# Patient Record
Sex: FEMALE | Race: BLACK OR AFRICAN AMERICAN | NOT HISPANIC OR LATINO | URBAN - METROPOLITAN AREA
[De-identification: names, ages, dates, MRNs, and addresses within clinical notes are randomized per-mention and may not be internally consistent; named-entity substitution may affect disease eponyms.]

---

## 2021-02-15 ENCOUNTER — EMERGENCY (EMERGENCY)
Facility: HOSPITAL | Age: 81
LOS: 0 days | Discharge: HOME | End: 2021-02-16
Attending: EMERGENCY MEDICINE | Admitting: EMERGENCY MEDICINE
Payer: COMMERCIAL

## 2021-02-15 VITALS
WEIGHT: 240.08 LBS | TEMPERATURE: 99 F | SYSTOLIC BLOOD PRESSURE: 178 MMHG | RESPIRATION RATE: 16 BRPM | OXYGEN SATURATION: 96 % | HEIGHT: 64 IN | HEART RATE: 68 BPM | DIASTOLIC BLOOD PRESSURE: 90 MMHG

## 2021-02-15 DIAGNOSIS — R07.9 CHEST PAIN, UNSPECIFIED: ICD-10-CM

## 2021-02-15 DIAGNOSIS — R07.89 OTHER CHEST PAIN: ICD-10-CM

## 2021-02-15 DIAGNOSIS — Z20.822 CONTACT WITH AND (SUSPECTED) EXPOSURE TO COVID-19: ICD-10-CM

## 2021-02-15 LAB
ANION GAP SERPL CALC-SCNC: 12 MMOL/L — SIGNIFICANT CHANGE UP (ref 7–14)
BASOPHILS # BLD AUTO: 0.02 K/UL — SIGNIFICANT CHANGE UP (ref 0–0.2)
BASOPHILS NFR BLD AUTO: 0.4 % — SIGNIFICANT CHANGE UP (ref 0–1)
BUN SERPL-MCNC: 22 MG/DL — HIGH (ref 10–20)
CALCIUM SERPL-MCNC: 9.9 MG/DL — SIGNIFICANT CHANGE UP (ref 8.5–10.1)
CHLORIDE SERPL-SCNC: 103 MMOL/L — SIGNIFICANT CHANGE UP (ref 98–110)
CO2 SERPL-SCNC: 26 MMOL/L — SIGNIFICANT CHANGE UP (ref 17–32)
CREAT SERPL-MCNC: 0.9 MG/DL — SIGNIFICANT CHANGE UP (ref 0.7–1.5)
EOSINOPHIL # BLD AUTO: 0.21 K/UL — SIGNIFICANT CHANGE UP (ref 0–0.7)
EOSINOPHIL NFR BLD AUTO: 4.1 % — SIGNIFICANT CHANGE UP (ref 0–8)
GLUCOSE SERPL-MCNC: 83 MG/DL — SIGNIFICANT CHANGE UP (ref 70–99)
HCT VFR BLD CALC: 42.7 % — SIGNIFICANT CHANGE UP (ref 37–47)
HGB BLD-MCNC: 14 G/DL — SIGNIFICANT CHANGE UP (ref 12–16)
IMM GRANULOCYTES NFR BLD AUTO: 0 % — LOW (ref 0.1–0.3)
LYMPHOCYTES # BLD AUTO: 2.66 K/UL — SIGNIFICANT CHANGE UP (ref 1.2–3.4)
LYMPHOCYTES # BLD AUTO: 51.9 % — HIGH (ref 20.5–51.1)
MCHC RBC-ENTMCNC: 28.7 PG — SIGNIFICANT CHANGE UP (ref 27–31)
MCHC RBC-ENTMCNC: 32.8 G/DL — SIGNIFICANT CHANGE UP (ref 32–37)
MCV RBC AUTO: 87.7 FL — SIGNIFICANT CHANGE UP (ref 81–99)
MONOCYTES # BLD AUTO: 0.52 K/UL — SIGNIFICANT CHANGE UP (ref 0.1–0.6)
MONOCYTES NFR BLD AUTO: 10.1 % — HIGH (ref 1.7–9.3)
NEUTROPHILS # BLD AUTO: 1.72 K/UL — SIGNIFICANT CHANGE UP (ref 1.4–6.5)
NEUTROPHILS NFR BLD AUTO: 33.5 % — LOW (ref 42.2–75.2)
NRBC # BLD: 0 /100 WBCS — SIGNIFICANT CHANGE UP (ref 0–0)
NT-PROBNP SERPL-SCNC: 12 PG/ML — SIGNIFICANT CHANGE UP (ref 0–300)
PLATELET # BLD AUTO: 223 K/UL — SIGNIFICANT CHANGE UP (ref 130–400)
POTASSIUM SERPL-MCNC: 4.3 MMOL/L — SIGNIFICANT CHANGE UP (ref 3.5–5)
POTASSIUM SERPL-SCNC: 4.3 MMOL/L — SIGNIFICANT CHANGE UP (ref 3.5–5)
RBC # BLD: 4.87 M/UL — SIGNIFICANT CHANGE UP (ref 4.2–5.4)
RBC # FLD: 14.3 % — SIGNIFICANT CHANGE UP (ref 11.5–14.5)
SARS-COV-2 RNA SPEC QL NAA+PROBE: SIGNIFICANT CHANGE UP
SODIUM SERPL-SCNC: 141 MMOL/L — SIGNIFICANT CHANGE UP (ref 135–146)
TROPONIN T SERPL-MCNC: <0.01 NG/ML — SIGNIFICANT CHANGE UP
TROPONIN T SERPL-MCNC: <0.01 NG/ML — SIGNIFICANT CHANGE UP
WBC # BLD: 5.13 K/UL — SIGNIFICANT CHANGE UP (ref 4.8–10.8)
WBC # FLD AUTO: 5.13 K/UL — SIGNIFICANT CHANGE UP (ref 4.8–10.8)

## 2021-02-15 PROCEDURE — 71045 X-RAY EXAM CHEST 1 VIEW: CPT | Mod: 26

## 2021-02-15 PROCEDURE — 99218: CPT

## 2021-02-15 PROCEDURE — 93010 ELECTROCARDIOGRAM REPORT: CPT | Mod: 76

## 2021-02-15 RX ORDER — SODIUM CHLORIDE 9 MG/ML
1000 INJECTION, SOLUTION INTRAVENOUS ONCE
Refills: 0 | Status: COMPLETED | OUTPATIENT
Start: 2021-02-15 | End: 2021-02-15

## 2021-02-15 RX ORDER — METFORMIN HYDROCHLORIDE 850 MG/1
500 TABLET ORAL
Refills: 0 | Status: DISCONTINUED | OUTPATIENT
Start: 2021-02-15 | End: 2021-02-16

## 2021-02-15 RX ORDER — ATORVASTATIN CALCIUM 80 MG/1
20 TABLET, FILM COATED ORAL AT BEDTIME
Refills: 0 | Status: DISCONTINUED | OUTPATIENT
Start: 2021-02-15 | End: 2021-02-16

## 2021-02-15 RX ORDER — CARVEDILOL PHOSPHATE 80 MG/1
25 CAPSULE, EXTENDED RELEASE ORAL EVERY 12 HOURS
Refills: 0 | Status: DISCONTINUED | OUTPATIENT
Start: 2021-02-15 | End: 2021-02-16

## 2021-02-15 RX ORDER — ASPIRIN/CALCIUM CARB/MAGNESIUM 324 MG
325 TABLET ORAL ONCE
Refills: 0 | Status: COMPLETED | OUTPATIENT
Start: 2021-02-15 | End: 2021-02-15

## 2021-02-15 RX ADMIN — METFORMIN HYDROCHLORIDE 500 MILLIGRAM(S): 850 TABLET ORAL at 21:14

## 2021-02-15 RX ADMIN — SODIUM CHLORIDE 1000 MILLILITER(S): 9 INJECTION, SOLUTION INTRAVENOUS at 16:54

## 2021-02-15 RX ADMIN — SODIUM CHLORIDE 1000 MILLILITER(S): 9 INJECTION, SOLUTION INTRAVENOUS at 21:31

## 2021-02-15 RX ADMIN — CARVEDILOL PHOSPHATE 25 MILLIGRAM(S): 80 CAPSULE, EXTENDED RELEASE ORAL at 21:14

## 2021-02-15 RX ADMIN — Medication 325 MILLIGRAM(S): at 19:35

## 2021-02-15 NOTE — ED ADULT TRIAGE NOTE - CHIEF COMPLAINT QUOTE
Patient presents to ED with complaints of left sided chest pain since yesterday that radiates to the bilateral upper back. Patient also reports intermittent head discomfort for last 2 months.

## 2021-02-15 NOTE — ED PROVIDER NOTE - CLINICAL SUMMARY MEDICAL DECISION MAKING FREE TEXT BOX
80F with PMH HTN, HLD, NIDDM who presents to [facility] for non-exertional chest discomfort described as "pounding" that radiates to her left shoulder and mid-scapula. Back pain exacerbated with movement, reproducible. Pain started yesterday while she was sitting watching TV. Denies SOB, YUEN, LE edema, focal weakness or numbness. She reports LHC many yrs ago at [facility]; cannot recall findings or cardiologist. Also reports exercise ST at [facility] "a long time ago." Reports gradual onset intermittent "funny feeling" in her head x2mo, denies HA currently. EKG non-ischemic, CXR clear, trop neg x1, bnp 12. VSS, will place in obs for further diagnostic testing.

## 2021-02-15 NOTE — ED CDU PROVIDER INITIAL DAY NOTE - NS ED ROS FT
Eyes:  No visual changes, eye pain or discharge.  ENMT:  No hearing changes, pain, discharge or infections. No neck pain or stiffness.  Cardiac:  No chest pain, SOB or edema. No chest pain with exertion.  Respiratory:  No cough or respiratory distress. No hemoptysis. No history of asthma or RAD.  GI:  No nausea, vomiting, diarrhea or abdominal pain.  :  No dysuria, frequency or burning.  MS:  No myalgia, muscle weakness, joint pain or back pain.  Neuro:  No headache or weakness.  No LOC.  Skin:  No skin rash.   Endocrine: No history of thyroid disease or diabetes.  Except as documented in the HPI,  all other systems are negative. Eyes:  No visual changes, eye pain or discharge.  ENMT:  No hearing changes, pain, discharge or infections. No neck pain or stiffness.  Cardiac:  + cp No SOB or edema. No chest pain with exertion.  Respiratory:  No cough or respiratory distress. No hemoptysis. No history of asthma or RAD.  GI:  No nausea, vomiting, diarrhea or abdominal pain.  :  No dysuria, frequency or burning.  MS:  No myalgia, muscle weakness, joint pain or back pain.  Neuro:  No headache or weakness.  No LOC.  Skin:  No skin rash.   Endocrine: + DM No history of thyroid disease  Except as documented in the HPI,  all other systems are negative.

## 2021-02-15 NOTE — ED PROVIDER NOTE - NS ED ROS FT
Constitutional: no fever, chills, no recent weight loss, change in appetite or malaise  Eyes: no redness/discharge/pain/vision changes  ENT: no rhinorrhea/ear pain/sore throat  Cardiac: No SOB or edema.  Respiratory: No cough or respiratory distress  GI: No nausea, vomiting, diarrhea or abdominal pain.  : No dysuria, frequency, urgency or hematuria  MS: no pain to back or other extremities, no loss of ROM, no weakness  Neuro: No headache or weakness. No LOC.  Skin: No skin rash  Except as documented in the HPI, all other systems are negative.

## 2021-02-15 NOTE — ED CDU PROVIDER INITIAL DAY NOTE - OBJECTIVE STATEMENT
Pt is a 81y/o female with a pmhx of DM, HTN, HLD presents today for eval of 2 days of midsternal chest pressure with radiation to the back with no aggravating/alleviating factors. Pt denies fever, chills, weakness, numbness, Abd pain.

## 2021-02-15 NOTE — ED ADULT NURSE NOTE - OBJECTIVE STATEMENT
pt presents to ED c/o left sided chest pain radiating to shoulder and back x 1 day, described as dull. Denies SOB

## 2021-02-15 NOTE — ED CDU PROVIDER INITIAL DAY NOTE - ATTENDING CONTRIBUTION TO CARE
2 days of midsternal chest pressure with radiation to the back with no aggravating/alleviating factors. no other associated ysmpomts. plan is to obtain ccta.

## 2021-02-15 NOTE — ED PROVIDER NOTE - OBJECTIVE STATEMENT
pt with pmhx HTN, DM, hypercholesterolemia presents to ED reporting CP that starts under left breast and radiates to left shoulder, worse with movement, since this AM. pain is sharp, nonradiating, moderate. denies exacerbating or relieving factors. she reports a stress test "many years ago" and it was normal, but does not see a cardiologist regularly. Denies fever/chill/HA/dizziness/chest pain/palpitation/sob/abd pain/n/v/d/ black stool/bloody stool/urinary sxs

## 2021-02-15 NOTE — ED CDU PROVIDER INITIAL DAY NOTE - PHYSICAL EXAMINATION
VITAL SIGNS: I have reviewed nursing notes and confirm.  CONSTITUTIONAL: Well-developed; well-nourished; in no acute distress.   SKIN:  skin exam is warm and dry, no acute rash.    HEAD: Normocephalic; atraumatic.  EYES: conjunctiva and sclera clear.  ENT: No nasal discharge; airway clear.  CARD: S1, S2 normal; no murmurs, gallops, or rubs. Regular rate and rhythm.   RESP: No wheezes, rales or rhonchi.  ABD: Normal bowel sounds; soft; non-distended; non-tender  EXT: Normal ROM.  No clubbing, cyanosis or edema.   LYMPH: No acute cervical adenopathy.  NEURO: Alert, oriented, grossly unremarkable  PSYCH: Cooperative, appropriate.

## 2021-02-15 NOTE — ED PROVIDER NOTE - ATTENDING CONTRIBUTION TO CARE
80F with PMH HTN, HLD, NIDDM who presents to [facility] for non-exertional chest discomfort described as "pounding" that radiates to her left shoulder and mid-scapula. Back pain exacerbated with movement. Pain started yesterday while she was sitting watching TV. Denies SOB, YUEN, LE edema, focal weakness or numbness. She reports LHC many yrs ago at [facility]; cannot recall findings or cardiologist. Also reports exercise ST at [employer] "a long time ago." Reports gradual onset intermittent "funny feeling" in her head x2mo, denies HA currently.     CONSTITUTIONAL: Well-developed; well-nourished; in no acute distress. Sitting up and providing appropriate history and physical examination  SKIN: skin exam is warm and dry, no acute rash.  HEAD: Normocephalic; atraumatic.  EYES: PERRL, 3 mm bilateral, no nystagmus, EOM intact; conjunctiva and sclera clear.  ENT: No nasal discharge; airway clear.  NECK: Supple; non tender. + full passive ROM in all directions. No JVD  CARD: S1, S2 normal; no murmurs, gallops, or rubs. Regular rate and rhythm. + Symmetric Strong Pulses  RESP: No wheezes, rales or rhonchi. Good air movement bilaterally  ABD: soft; non-distended; non-tender. No Rebound, No Guarding, No signs of peritonitis, No CVA tenderness. No pulsatile abdominal mass. + Strong and Symmetric Pulses  EXT: Normal ROM. No clubbing, cyanosis or edema. Dp and Pt Pulses intact. Cap refill less than 3 seconds  NEURO: CN 2-12 intact, normal finger to nose, stable gait, no sensory or motor deficits, Alert, oriented, grossly unremarkable. No Focal deficits. GCS 15. NIH 0  PSYCH: Cooperative, appropriate.    a/p: HEART score 4- will obtain ekg, cxr, labs incl trop, bnp. HR 60s on eval- pt a good candidate for ccta. 80F with PMH HTN, HLD, NIDDM who presents to [facility] for non-exertional chest discomfort described as "pounding" that radiates to her left shoulder and mid-scapula. Back pain exacerbated with movement, reproducible. Pain started yesterday while she was sitting watching TV. Denies SOB, YUEN, LE edema, focal weakness or numbness. She reports LHC many yrs ago at [facility]; cannot recall findings or cardiologist. Also reports exercise ST at [employer] "a long time ago." Reports gradual onset intermittent "funny feeling" in her head x2mo, denies HA currently.     CONSTITUTIONAL: Well-developed; well-nourished; in no acute distress. Sitting up and providing appropriate history and physical examination  SKIN: skin exam is warm and dry, no acute rash.  HEAD: Normocephalic; atraumatic.  EYES: PERRL, 3 mm bilateral, no nystagmus, EOM intact; conjunctiva and sclera clear.  ENT: No nasal discharge; airway clear.  NECK: Supple; non tender. + full passive ROM in all directions. No JVD  CARD: S1, S2 normal; no murmurs, gallops, or rubs. Regular rate and rhythm. + Symmetric Strong Pulses  RESP: No wheezes, rales or rhonchi. Good air movement bilaterally  ABD: soft; non-distended; non-tender. No Rebound, No Guarding, No signs of peritonitis, No CVA tenderness. No pulsatile abdominal mass. + Strong and Symmetric Pulses  EXT: Normal ROM. No clubbing, cyanosis or edema. Dp and Pt Pulses intact. Cap refill less than 3 seconds  NEURO: CN 2-12 intact, normal finger to nose, stable gait, no sensory or motor deficits, Alert, oriented, grossly unremarkable. No Focal deficits. GCS 15. NIH 0  PSYCH: Cooperative, appropriate.    a/p: HEART score 4- will obtain ekg, cxr, labs incl trop, bnp. HR 60s on eval- pt a good candidate for ccta.

## 2021-02-16 VITALS
OXYGEN SATURATION: 100 % | RESPIRATION RATE: 18 BRPM | DIASTOLIC BLOOD PRESSURE: 53 MMHG | SYSTOLIC BLOOD PRESSURE: 95 MMHG | HEART RATE: 59 BPM

## 2021-02-16 PROCEDURE — 99217: CPT

## 2021-02-16 PROCEDURE — 75574 CT ANGIO HRT W/3D IMAGE: CPT | Mod: 26

## 2021-02-16 RX ORDER — METOPROLOL TARTRATE 50 MG
25 TABLET ORAL ONCE
Refills: 0 | Status: COMPLETED | OUTPATIENT
Start: 2021-02-16 | End: 2021-02-16

## 2021-02-16 RX ADMIN — CARVEDILOL PHOSPHATE 25 MILLIGRAM(S): 80 CAPSULE, EXTENDED RELEASE ORAL at 06:13

## 2021-02-16 RX ADMIN — Medication 25 MILLIGRAM(S): at 07:39

## 2021-02-16 NOTE — ED CDU PROVIDER DISPOSITION NOTE - PATIENT PORTAL LINK FT
You can access the FollowMyHealth Patient Portal offered by [facility] by registering at the following website: [email or web address]. By joining [facility]’s FollowMyHealth portal, you will also be able to view your health information using other applications (apps) compatible with our system.

## 2021-02-16 NOTE — CHART NOTE - NSCHARTNOTEFT_GEN_A_CORE
LMSW met with pt at the bedside to assess for potential d/c needs. Pt admitted to ED under OBS presenting with c/o chest pain. Pt reported to be independent with ambulation and ADLs/IADLs; uses public transport or car service as primary mode of transport. Pt resides in a private home with her sister and brother. Pt is connected to PCP Dr. Yates and GYN, Ophthalmologist, GI specialists. No social work or discharge needs identified during assessment. Pt will discharge home with no needs.    LMSW will remain available in the event any d/c needs arise.

## 2021-02-16 NOTE — ED CDU PROVIDER SUBSEQUENT DAY NOTE - HISTORY
pt is resting comfortably. she relates she still has the chest pressure, it is unchanged since arrival and is constant 1-2/10 pain. trop neg x 2, EKg unchanged. pending provocative testing in am.

## 2021-02-16 NOTE — ED CDU PROVIDER SUBSEQUENT DAY NOTE - MEDICAL DECISION MAKING DETAILS
2 days of midsternal chest pressure with radiation to the back with no aggravating/alleviating factors. on my evaluation in the am patient resting comfortably, without pain, awaiting ccta

## 2021-06-23 PROBLEM — Z00.00 ENCOUNTER FOR PREVENTIVE HEALTH EXAMINATION: Status: ACTIVE | Noted: 2021-06-23

## 2023-08-02 PROBLEM — E11.9 TYPE 2 DIABETES MELLITUS WITHOUT COMPLICATIONS: Chronic | Status: ACTIVE | Noted: 2021-02-15

## 2023-08-02 PROBLEM — E78.00 PURE HYPERCHOLESTEROLEMIA, UNSPECIFIED: Chronic | Status: ACTIVE | Noted: 2021-02-15

## 2023-08-02 PROBLEM — I10 ESSENTIAL (PRIMARY) HYPERTENSION: Chronic | Status: ACTIVE | Noted: 2021-02-15

## 2023-08-16 ENCOUNTER — APPOINTMENT (OUTPATIENT)
Dept: PAIN MANAGEMENT | Facility: CLINIC | Age: 83
End: 2023-08-16
Payer: MEDICARE

## 2023-08-16 VITALS
HEART RATE: 78 BPM | DIASTOLIC BLOOD PRESSURE: 90 MMHG | SYSTOLIC BLOOD PRESSURE: 143 MMHG | HEIGHT: 64 IN | WEIGHT: 240 LBS | BODY MASS INDEX: 40.97 KG/M2

## 2023-08-16 DIAGNOSIS — Z86.39 PERSONAL HISTORY OF OTHER ENDOCRINE, NUTRITIONAL AND METABOLIC DISEASE: ICD-10-CM

## 2023-08-16 DIAGNOSIS — Z86.79 PERSONAL HISTORY OF OTHER DISEASES OF THE CIRCULATORY SYSTEM: ICD-10-CM

## 2023-08-16 DIAGNOSIS — Z87.39 PERSONAL HISTORY OF OTHER DISEASES OF THE MUSCULOSKELETAL SYSTEM AND CONNECTIVE TISSUE: ICD-10-CM

## 2023-08-16 PROCEDURE — 99204 OFFICE O/P NEW MOD 45 MIN: CPT

## 2023-08-16 RX ORDER — METFORMIN HYDROCHLORIDE 500 MG/1
500 TABLET, COATED ORAL
Refills: 0 | Status: ACTIVE | COMMUNITY

## 2023-08-16 NOTE — DATA REVIEWED
[FreeTextEntry1] : SOAPP: low risk 8/16/23 Low risk: Patient has combination of a low risk SOAP and no risk factors. UDS would be repeated randomly every quarter.  UDS: No data obtained today.  [place] REGISTRY: Checked.

## 2023-08-16 NOTE — ASSESSMENT
[FreeTextEntry1] : This is an 82-year-old female with a chief complaint of bilateral hip pain and a secondary complaint of lower back pain. The pain from her hips travels down into the thighs. She has difficulty standing for long periods of time secondary to pain. To rule out etiology of the lumbar spine, we will submit for an MRI. She recently has x-rays of the bilateral hips which is not available for review today. We will submit for x-rays of the B/L hips for further review. In the interim, she is advised to begin a physical therapy program for symptom management. Patient will follow up in 3 weeks for reassessment. All this patients questions were answered and the conversation was understood well.  Lumbar MRI was ordered to delineate spine pathology such as disc displacement and stenosis.  We will order aB/L hip 2 view x-ray due to pain and decrease in range of motion in that area to delineate a pain generator.  Physical therapy of the lumbar spine 2-3 times a week for 4-8 weeks stressing a home exercise program of walking, shoulder girdle strengthening,  swimming, elliptical , recumbent bike, Hardeep chi and Yoga. Use things that heat like hot shower or icy heat before rehab, exercising and at the beginning of the day, and ice (ice in a bag never directly on the skin) after activity and at the end of the day.  Physical therapy of the  bilateral hip 2-3 times a week for 4-8 weeks stressing a home exercise program of walking, shoulder griddle strengthening,  swimming, elliptical , recumbent bike, Hardeep chi and Yoga. Use things that heat like hot shower or icy heat before rehab, exercising and at the beginning of the day, and ice (ice in a bag never directly on the skin) after activity and at the end of the day.  I, Tierra Villanueva, attest that this documentation has been prepared under the direction and in the presence of Provider Becca Morocho MD.   Thank you for allowing me to assist in the management of this patient.    Best Regards,    Becca Morocho M.D., FAAPMR   Diplomate, American Board of Physical Medicine and Rehabilitation Diplomate, American Board of Pain Medicine  Diplomate, American Board of Pain Management

## 2023-08-16 NOTE — HISTORY OF PRESENT ILLNESS
[FreeTextEntry1] : This is an 82-year-old female here to establish care for a chief complaint of bilateral hip pain with secondary lower back pain that has been ongoing for 4-5 years. She was previously under the care of Dr. Rosales, a pain management provider, in 2018. She trialed physical therapy in the past which provided her no relief of her symptoms. Her knee pain travels down to her lateral thighs, R>L, and is described as severe. The pain impacts functionality and her ability to perform her ADLs.  She is not able to stand long periods of time secondary to pain. She sought care for the hip pain with an orthopedic surgeon. He performed x-rays and suggested she eventually need a hip replacement. These x-rays are not available for review. She has no recent imaging of the lumbar spine.   The patient has had this pain for 1 year worsening within the last 2 months. The pain started after not injury patient describes pain as moderate to severe nearly constant. During the last month the pain has been worse during the and no typical pattern. Pain described as sharp, pressure-like, throbbing, cutting. Patient has weakness in the lower extremities. Pain is increased with standing, walking, exercise. Pain is decreased with lying down, sitting, relaxation. Bowel or bladder habits have not changed.  ACTIVITIES: Patient could walk less than 1 block before the pain starts. Patient can sit 1 hour before pain starts. Patient can stand 2 minutes before pain starts. The patient seldom lays down because of pain. Patient uses no assistive walking device at this time. Patient has difficulty performing household chores, doing yardwork shopping, participating regulation activities, exercising at this time.  PRIOR PAIN TREATMENTS: No relief with physical therapy, heat treatment.  PRIOR PAIN MEDICATIONS: Tramadol, Tylenol.

## 2023-08-16 NOTE — PHYSICAL EXAM
[Normal Coordination] : normal coordination [Normal DTR UE/LE] : normal DTR UE/LE  [Normal Sensation] : normal sensation [Normal Mood and Affect] : normal mood and affect [Orientated] : orientated [Able to Communicate] : able to communicate [Well Developed] : well developed [Well Nourished] : well nourished [Flexion] : flexion [Extension] : extension [Diminished] : patella reflex diminished [Bilateral] : hip bilaterally [de-identified] : Obese [] : patient ambulates without assistive device

## 2023-09-21 ENCOUNTER — APPOINTMENT (OUTPATIENT)
Dept: PAIN MANAGEMENT | Facility: CLINIC | Age: 83
End: 2023-09-21
Payer: MEDICARE

## 2023-09-21 VITALS
HEART RATE: 81 BPM | DIASTOLIC BLOOD PRESSURE: 88 MMHG | BODY MASS INDEX: 40.97 KG/M2 | HEIGHT: 64 IN | SYSTOLIC BLOOD PRESSURE: 131 MMHG | WEIGHT: 240 LBS

## 2023-09-21 PROCEDURE — 99213 OFFICE O/P EST LOW 20 MIN: CPT

## 2023-09-21 RX ORDER — IBUPROFEN 800 MG/1
800 TABLET, FILM COATED ORAL 3 TIMES DAILY
Qty: 90 | Refills: 0 | Status: ACTIVE | COMMUNITY
Start: 2023-09-21 | End: 1900-01-01

## 2023-10-05 ENCOUNTER — APPOINTMENT (OUTPATIENT)
Dept: PAIN MANAGEMENT | Facility: CLINIC | Age: 83
End: 2023-10-05
Payer: MEDICARE

## 2023-10-05 PROCEDURE — 20610 DRAIN/INJ JOINT/BURSA W/O US: CPT | Mod: 50

## 2023-10-05 PROCEDURE — 77002 NEEDLE LOCALIZATION BY XRAY: CPT | Mod: 79

## 2023-10-11 ENCOUNTER — APPOINTMENT (OUTPATIENT)
Dept: OBGYN | Facility: CLINIC | Age: 83
End: 2023-10-11
Payer: MEDICARE

## 2023-10-11 VITALS
WEIGHT: 247 LBS | DIASTOLIC BLOOD PRESSURE: 82 MMHG | HEIGHT: 64 IN | HEART RATE: 81 BPM | BODY MASS INDEX: 42.17 KG/M2 | SYSTOLIC BLOOD PRESSURE: 132 MMHG

## 2023-10-11 DIAGNOSIS — R92.2 INCONCLUSIVE MAMMOGRAM: ICD-10-CM

## 2023-10-11 DIAGNOSIS — Z78.9 OTHER SPECIFIED HEALTH STATUS: ICD-10-CM

## 2023-10-11 DIAGNOSIS — Z83.3 FAMILY HISTORY OF DIABETES MELLITUS: ICD-10-CM

## 2023-10-11 DIAGNOSIS — Z82.49 FAMILY HISTORY OF ISCHEMIC HEART DISEASE AND OTHER DISEASES OF THE CIRCULATORY SYSTEM: ICD-10-CM

## 2023-10-11 DIAGNOSIS — Z01.419 ENCOUNTER FOR GYNECOLOGICAL EXAMINATION (GENERAL) (ROUTINE) W/OUT ABNORMAL FINDINGS: ICD-10-CM

## 2023-10-11 DIAGNOSIS — R92.30 INCONCLUSIVE MAMMOGRAM: ICD-10-CM

## 2023-10-11 PROCEDURE — G0101: CPT | Mod: GA

## 2023-10-11 RX ORDER — ROSUVASTATIN CALCIUM 20 MG/1
20 TABLET, FILM COATED ORAL
Refills: 0 | Status: ACTIVE | COMMUNITY

## 2023-10-11 RX ORDER — CARVEDILOL 25 MG/1
25 TABLET, FILM COATED ORAL
Refills: 0 | Status: ACTIVE | COMMUNITY

## 2023-10-11 RX ORDER — LOSARTAN POTASSIUM AND HYDROCHLOROTHIAZIDE 25; 100 MG/1; MG/1
100-25 TABLET ORAL
Refills: 0 | Status: ACTIVE | COMMUNITY

## 2023-10-13 LAB
C TRACH RRNA SPEC QL NAA+PROBE: NOT DETECTED
HPV HIGH+LOW RISK DNA PNL CVX: NOT DETECTED
N GONORRHOEA RRNA SPEC QL NAA+PROBE: NOT DETECTED
SOURCE AMPLIFICATION: NORMAL

## 2023-10-16 LAB
CYTOLOGY CVX/VAG DOC THIN PREP: ABNORMAL
SOURCE AMPLIFICATION: NORMAL
T VAGINALIS RRNA SPEC QL NAA+PROBE: NOT DETECTED

## 2023-10-18 ENCOUNTER — APPOINTMENT (OUTPATIENT)
Dept: OBGYN | Facility: CLINIC | Age: 83
End: 2023-10-18
Payer: MEDICARE

## 2023-10-18 ENCOUNTER — ASOB RESULT (OUTPATIENT)
Age: 83
End: 2023-10-18

## 2023-10-18 DIAGNOSIS — Z86.018 PERSONAL HISTORY OF OTHER BENIGN NEOPLASM: ICD-10-CM

## 2023-10-18 PROCEDURE — ZZZZZ: CPT

## 2023-10-18 PROCEDURE — 99212 OFFICE O/P EST SF 10 MIN: CPT

## 2023-10-18 PROCEDURE — 76830 TRANSVAGINAL US NON-OB: CPT

## 2023-10-28 ENCOUNTER — NON-APPOINTMENT (OUTPATIENT)
Age: 83
End: 2023-10-28

## 2023-11-01 ENCOUNTER — APPOINTMENT (OUTPATIENT)
Dept: PAIN MANAGEMENT | Facility: CLINIC | Age: 83
End: 2023-11-01
Payer: MEDICARE

## 2023-11-01 VITALS
HEIGHT: 64 IN | WEIGHT: 247 LBS | SYSTOLIC BLOOD PRESSURE: 129 MMHG | HEART RATE: 85 BPM | BODY MASS INDEX: 42.17 KG/M2 | DIASTOLIC BLOOD PRESSURE: 84 MMHG

## 2023-11-01 PROCEDURE — 99213 OFFICE O/P EST LOW 20 MIN: CPT

## 2023-11-30 ENCOUNTER — APPOINTMENT (OUTPATIENT)
Dept: PAIN MANAGEMENT | Facility: CLINIC | Age: 83
End: 2023-11-30

## 2024-01-19 ENCOUNTER — APPOINTMENT (OUTPATIENT)
Dept: PAIN MANAGEMENT | Facility: CLINIC | Age: 84
End: 2024-01-19
Payer: MEDICARE

## 2024-01-19 PROCEDURE — 93040 RHYTHM ECG WITH REPORT: CPT | Mod: 79

## 2024-01-19 PROCEDURE — 64450 NJX AA&/STRD OTHER PN/BRANCH: CPT | Mod: RT

## 2024-01-19 PROCEDURE — 93770 DETERMINATION VENOUS PRESS: CPT

## 2024-01-19 PROCEDURE — 77002 NEEDLE LOCALIZATION BY XRAY: CPT | Mod: 79

## 2024-01-19 PROCEDURE — 94761 N-INVAS EAR/PLS OXIMETRY MLT: CPT

## 2024-01-19 PROCEDURE — 00630 ANES PX LUMBAR REGION NOS: CPT | Mod: QZ,P3

## 2024-01-19 NOTE — PROCEDURE
[FreeTextEntry3] : DIAGNOSIC ARTICULAR BRANCH OF THE OBTURATOR AND FEMORAL INJECTION UNDER FLURO GUIDANCE     Date:  2024  Patient: Jose Yadav  :  1940   Pre-op Diagnosis:  Right Hip pain, Hip  arthropathy.  Post-op Diagnosis: Same  Procedure:Right hip Diagnostic articular branch of the obturator and femoral injection with fluro guidance.  Anesthesiologist/CRNA: Mr. Rosenberg  Anesthesia: See Nurses note. MAC/Cold spray. Versed 2 mg IVP    Physician: Becca Morocho MD, FAAPMR    Medical necessity: Failure of conservative medical management    CONSENT: The possible complications including infection, bleeding, nerve damage, hospital admission,  death or failure of the procedure; though unusual, are theoretically possible. The patient was educated about the of the procedure and alternative therapies. All questions were answered and the patient freely gave consent to proceed.    Monitoring:  Patient had continuous blood pressure, EKG, and pulse oximetry throughout the case. See nurse's notes.     Procedure Note:  After obtaining written consent, the patient was placed in a supine position on the fluoroscopy table. The skin of the target of the articular branch of the obturator and femoral was prepped with a Betadine solution and draped at the affected hip. A , time out was performed. Cold spray was used to localize the area Under fluoroscopy, the needle was placed at the site below the inferior junction of the pubis and the ischium, which appears teardrop in shape in the anteroposterior radiograph another needle was placed from a site below the anterior superior iliac spine to the anterolateral aspect of the extraarticular portion of the hip joint. The needles were advanced using fluoroscopic guidance until reaching the areas above.  Again, after negative aspiration for air or heme, 1-2 mL of a solution containing 0.5% bupivacaine was injected at each of the 2 sites. Needle was removed intact. Band aid was place on the sites.     Complications: None. The patient tolerated the procedure well.     Disposition: I have examined the patient and there are no new physical findings since the original presentation.  Sensory and motor function were intact. The patient met discharge criteria see nurses notes. The patient was discharged home with a .  The discharge instruction sheet was reviewed and given to the patient.      Comment: If patient has a diagnostic articular injections with 70% relief greater than 2 hours or 50% greater than 24 hours would proceed to RFA. If 50% less than 24 hours would repeat to confirm for RFA. Follow in office. Call if any problems.  Indication for RFA: The pt had a positive response to the articular injections and is considered a good candidate for the thermal RF ablation procedure. The diagnostic injection provided at least 70% reduction in pain for 1 hour or 50% relief for 24 hours and the following criteria have been met. Failed response physical therapy, steroid injection, hyaline supplementation injection, not surgical candidate or does not what to proceed with surgery.     This document was electronically signed by:   Becca Morocho MD, FAAPMR Diplomate, American Board of Physical Medicine and Rehabilitation Diplomate, American Board of Pain Medicine

## 2024-02-02 ENCOUNTER — APPOINTMENT (OUTPATIENT)
Dept: PAIN MANAGEMENT | Facility: CLINIC | Age: 84
End: 2024-02-02

## 2024-02-14 ENCOUNTER — APPOINTMENT (OUTPATIENT)
Dept: PAIN MANAGEMENT | Facility: CLINIC | Age: 84
End: 2024-02-14
Payer: MEDICARE

## 2024-02-14 DIAGNOSIS — M25.551 PAIN IN RIGHT HIP: ICD-10-CM

## 2024-02-14 DIAGNOSIS — M54.40 LUMBAGO WITH SCIATICA, UNSPECIFIED SIDE: ICD-10-CM

## 2024-02-14 DIAGNOSIS — G89.29 LUMBAGO WITH SCIATICA, UNSPECIFIED SIDE: ICD-10-CM

## 2024-02-14 DIAGNOSIS — M54.16 RADICULOPATHY, LUMBAR REGION: ICD-10-CM

## 2024-02-14 DIAGNOSIS — M25.552 PAIN IN RIGHT HIP: ICD-10-CM

## 2024-02-14 DIAGNOSIS — M16.11 UNILATERAL PRIMARY OSTEOARTHRITIS, RIGHT HIP: ICD-10-CM

## 2024-02-14 PROCEDURE — 99214 OFFICE O/P EST MOD 30 MIN: CPT

## 2024-02-14 NOTE — DATA REVIEWED
[FreeTextEntry1] : MRI of the lumbar spine dated 9/18/2023 finds moderate degenerative changes, mildly progressive from the prior study.  Mild disc bulge at L2-3 with mild right foraminal compromise, progressive.  Mild anterior listhesis of L3 on L4 with mild to moderate spinal stenosis, mildly progressive.  Mild anterior listhesis of L4 on L5 with mild spinal stenosis, stable.  Degenerative facet changes at L5-S1, stable.  X-ray of the bilateral hips dated 8/19/2023 finds severe osteoarthritis of the right and left hip.  SOAPP: low risk 8/16/23 Low risk: Patient has combination of a low risk SOAP and no risk factors. UDS would be repeated randomly every quarter.  UDS: No data obtained today.  [place] REGISTRY: Checked.

## 2024-02-14 NOTE — DISCUSSION/SUMMARY
[de-identified] : This is an 82-year-old female with a chief complaint of bilateral hip pain and a secondary complaint of lower back pain. She is most recently s/p a right hip obturator nerve block with at least 75% sustained relief. Overall, her right sided pain and functionality has improved.  Pain is now more dominant in her left hip. We will submit for a left obturator nerve block with MAC. Patient will f/u in 4 weeks for further evaluation.  I am planning a left articular branch of the obturator and femoral under fluoroscopy guidance to diagnosis hip pain, hip osteoarthritis and pain from hip replacement to decide on further treatment and possibly RFA   Risk, benefits, pros and cons of procedure were explained to the patient using models and diagrams and their questions were answered.  The patient has severe anxiety of procedures that necessitates monitored anesthesia care (MAC). The procedure performed will be close to major nerves, arteries, and spinal cord and/or joint structures. Due to the proximity of these structures, we need the patient to be still during the procedure.  With the help of MAC, this will be safely achieved and decrease the risk of any complications.  FERNANDEZ Alonzo MD

## 2024-02-14 NOTE — PHYSICAL EXAM
[Normal Coordination] : normal coordination [Normal DTR UE/LE] : normal DTR UE/LE  [Normal Sensation] : normal sensation [Normal Mood and Affect] : normal mood and affect [Oriented] : oriented [Able to Communicate] : able to communicate [Well Developed] : well developed [Well Nourished] : well nourished [Flexion] : flexion [Extension] : extension [Diminished] : patella reflex diminished [Bilateral] : hip bilaterally [de-identified] : Obese [] : no focal motor deficits

## 2024-02-14 NOTE — HISTORY OF PRESENT ILLNESS
[FreeTextEntry1] : ORIGINAL PRESENTATION: This is an 83-year-old female here to establish care for a chief complaint of bilateral hip pain with secondary lower back pain that has been ongoing for 4-5 years. She was previously under the care of Dr. Rosales, a pain management provider, in 2018. She trialed physical therapy in the past which provided her no relief of her symptoms. Her knee pain travels down to her lateral thighs, R>L, and is described as severe. The pain impacts functionality and her ability to perform her ADLs.  She is not able to stand long periods of time secondary to pain. She sought care for the hip pain with an orthopedic surgeon. He performed x-rays and suggested she eventually need a hip replacement. These x-rays are not available for review. She has no recent imaging of the lumbar spine.   The patient has had this pain for 1 year worsening within the last 2 months. The pain started after not injury patient describes pain as moderate to severe nearly constant. During the last month the pain has been worse during the and no typical pattern. Pain described as sharp, pressure-like, throbbing, cutting. Patient has weakness in the lower extremities. Pain is increased with standing, walking, exercise. Pain is decreased with lying down, sitting, relaxation. Bowel or bladder habits have not changed.  ACTIVITIES: Patient could walk less than 1 block before the pain starts. Patient can sit 1 hour before pain starts. Patient can stand 2 minutes before pain starts. The patient seldom lays down because of pain. Patient uses no assistive walking device at this time. Patient has difficulty performing household chores, doing yardwork shopping, participating regulation activities, exercising at this time.  PRIOR PAIN TREATMENTS: No relief with physical therapy, heat treatment.  PRIOR PAIN MEDICATIONS: Tramadol, Tylenol.  PATIENT PRESENTS FOR FOLLOW UP: She is under our care for a chief complaint of bilateral hip pain with secondary lower back pain that has been ongoing for 4-5 years. Presents for follow up she is s/p diagnostic RT and LT hip joint injections under fluoroscopy on 10/05/23 which provided her with moderate relief. She is most recently s/p a right hip obturator nerve block with at least 75% sustained relief. Overall, her right sided pain and functionality has improved. She has been able to ambulate, sit, and stand for longer periods of time. She does continue to note severe left sided hip pain with movement due to severe osteoarthritis. The pain continues to be severe and makes it difficult for her perform her ADLs. Previous physical therapy sessions provided her no relief of her symptoms. We discussed trialing a diagnostic obturator nerve injection and possible RFA for her left hip pain.

## 2024-03-15 ENCOUNTER — APPOINTMENT (OUTPATIENT)
Dept: PAIN MANAGEMENT | Facility: CLINIC | Age: 84
End: 2024-03-15
Payer: MEDICARE

## 2024-03-15 DIAGNOSIS — M16.12 UNILATERAL PRIMARY OSTEOARTHRITIS, LEFT HIP: ICD-10-CM

## 2024-03-15 PROCEDURE — 93770 DETERMINATION VENOUS PRESS: CPT | Mod: 59

## 2024-03-15 PROCEDURE — 77002 NEEDLE LOCALIZATION BY XRAY: CPT | Mod: 79

## 2024-03-15 PROCEDURE — 94761 N-INVAS EAR/PLS OXIMETRY MLT: CPT

## 2024-03-15 PROCEDURE — 01250 ANES ALL PX NRV MUSC UPR LEG: CPT | Mod: QZ,P2

## 2024-03-15 PROCEDURE — 93040 RHYTHM ECG WITH REPORT: CPT | Mod: 79

## 2024-03-15 PROCEDURE — 64450 NJX AA&/STRD OTHER PN/BRANCH: CPT | Mod: LT

## 2024-03-15 NOTE — PROCEDURE
[FreeTextEntry3] : DIAGNOSIC ARTICULAR BRANCH OF THE OBTURATOR AND FEMORAL INJECTION UNDER FLURO GUIDANCE     Date:  03/15/2024  Patient: Jose Yadav  :  1940   Pre-op Diagnosis:  Left Hip pain, Hip  arthropathy.  Post-op Diagnosis: Same  Procedure: Left hip Diagnostic articular branch of the obturator and femoral injection with fluro guidance.  Anesthesiologist/CRNA: Ms. Rosario  Anesthesia: See Nurses note. MAC/Cold spray. Versed 2 mg, fentanyl 50 mcg IVP    Physician: Becca Morocho MD, FAAPMR    Medical necessity: Failure of conservative medical management    CONSENT: The possible complications including infection, bleeding, nerve damage, hospital admission,  death or failure of the procedure; though unusual, are theoretically possible. The patient was educated about the of the procedure and alternative therapies. All questions were answered and the patient freely gave consent to proceed.    Monitoring:  Patient had continuous blood pressure, EKG, and pulse oximetry throughout the case. See nurse's notes.     Procedure Note:  After obtaining written consent, the patient was placed in a supine position on the fluoroscopy table. The skin of the target of the articular branch of the obturator and femoral was prepped with a Betadine solution and draped at the affected hip. A , time out was performed. Cold spray was used to localize the area Under fluoroscopy, the needle was placed at the site below the inferior junction of the pubis and the ischium, which appears teardrop in shape in the anteroposterior radiograph another needle was placed from a site below the anterior superior iliac spine to the anterolateral aspect of the extraarticular portion of the hip joint. The needles were advanced using fluoroscopic guidance until reaching the areas above.  Again, after negative aspiration for air or heme, 1-2 mL of a solution containing 0.5% bupivacaine was injected at each of the 2 sites. Needle was removed intact. Band aid was place on the sites.     Complications: None. The patient tolerated the procedure well.     Disposition: I have examined the patient and there are no new physical findings since the original presentation.  Sensory and motor function were intact. The patient met discharge criteria see nurses notes. The patient was discharged home with a .  The discharge instruction sheet was reviewed and given to the patient.      Comment: If patient has a diagnostic articular injections with 70% relief greater than 2 hours or 50% greater than 24 hours would proceed to RFA. If 50% less than 24 hours would repeat to confirm for RFA. Follow in office. Call if any problems.  Indication for RFA: The pt had a positive response to the articular injections and is considered a good candidate for the thermal RF ablation procedure. The diagnostic injection provided at least 70% reduction in pain for 1 hour or 50% relief for 24 hours and the following criteria have been met. Failed response physical therapy, steroid injection, hyaline supplementation injection, not surgical candidate or does not what to proceed with surgery.     This document was electronically signed by:   Becca Morocho MD, FAAPMR Diplomate, American Board of Physical Medicine and Rehabilitation Diplomate, American Board of Pain Medicine

## 2024-03-28 ENCOUNTER — APPOINTMENT (OUTPATIENT)
Dept: PAIN MANAGEMENT | Facility: CLINIC | Age: 84
End: 2024-03-28

## 2025-05-27 NOTE — ED PROVIDER NOTE - PHYSICAL EXAMINATION
CONSTITUTIONAL: Well-appearing; well-nourished; in no apparent distress.   CARDIOVASCULAR: Normal S1, S2; no murmurs, rubs, or gallops.   RESPIRATORY: Normal chest excursion with respiration; breath sounds clear and equal bilaterally; no wheezes, rhonchi, or rales.  SKIN: Normal for age and race; warm; dry; good turgor; no apparent lesions or exudate.   NEURO/PSYCH: A & O x 4; grossly unremarkable. mood and manner are appropriate. patient